# Patient Record
Sex: MALE | URBAN - METROPOLITAN AREA
[De-identification: names, ages, dates, MRNs, and addresses within clinical notes are randomized per-mention and may not be internally consistent; named-entity substitution may affect disease eponyms.]

---

## 2023-11-14 ENCOUNTER — HOSPITAL ENCOUNTER (EMERGENCY)
Facility: CLINIC | Age: 73
Discharge: LEFT BEFORE DISCHARGE | End: 2023-11-14
Admitting: EMERGENCY MEDICINE

## 2023-11-14 VITALS
SYSTOLIC BLOOD PRESSURE: 130 MMHG | TEMPERATURE: 100.2 F | DIASTOLIC BLOOD PRESSURE: 61 MMHG | RESPIRATION RATE: 18 BRPM | OXYGEN SATURATION: 98 % | WEIGHT: 120 LBS | HEART RATE: 94 BPM

## 2023-11-14 LAB
ALBUMIN UR-MCNC: 50 MG/DL
APPEARANCE UR: ABNORMAL
BACTERIA #/AREA URNS HPF: ABNORMAL /HPF
BILIRUB UR QL STRIP: NEGATIVE
COLOR UR AUTO: YELLOW
GLUCOSE UR STRIP-MCNC: NEGATIVE MG/DL
HGB UR QL STRIP: ABNORMAL
HYALINE CASTS: 3 /LPF
KETONES UR STRIP-MCNC: NEGATIVE MG/DL
LEUKOCYTE ESTERASE UR QL STRIP: ABNORMAL
MUCOUS THREADS #/AREA URNS LPF: PRESENT /LPF
NITRATE UR QL: NEGATIVE
PH UR STRIP: 7.5 [PH] (ref 5–7)
RBC URINE: 37 /HPF
SP GR UR STRIP: 1.02 (ref 1–1.03)
SQUAMOUS EPITHELIAL: <1 /HPF
UROBILINOGEN UR STRIP-MCNC: NORMAL MG/DL
WBC CLUMPS #/AREA URNS HPF: PRESENT /HPF
WBC URINE: >182 /HPF

## 2023-11-14 PROCEDURE — 81001 URINALYSIS AUTO W/SCOPE: CPT | Performed by: EMERGENCY MEDICINE

## 2023-11-14 PROCEDURE — 87086 URINE CULTURE/COLONY COUNT: CPT | Performed by: EMERGENCY MEDICINE

## 2023-11-14 PROCEDURE — 99281 EMR DPT VST MAYX REQ PHY/QHP: CPT

## 2023-11-14 NOTE — ED TRIAGE NOTES
Pt reports that he has been having urinary frequency, dysuria for that last few days. Pt reports going to UC today and they checked a UA. Pt denies belly pain but states that he has been more fatigued. Pt VSS and ABC's intact

## 2023-11-15 ENCOUNTER — TELEPHONE (OUTPATIENT)
Dept: EMERGENCY MEDICINE | Facility: CLINIC | Age: 73
End: 2023-11-15

## 2023-11-15 LAB — BACTERIA UR CULT: ABNORMAL

## 2023-11-15 NOTE — LETTER
November 15, 2023        Soren Calero  No address on file.          Dear Soren Calero:    You were seen in the [facility] Emergency Department at [facility] EMERGENCY DEPT on 11/14/2023.  We are unable to reach you by phone, so we are sending you this letter.     It is important that you call [facility] Emergency Department lab result nurse at [phone], as we have information to relay to you AND/OR we MAY have to make some changes in your treatment.    Best time to call back is between 9AM and 5:30PM, 7 days a week.      Sincerely,     [facility] Emergency Department Lab Result RN  [phone]

## 2023-11-15 NOTE — TELEPHONE ENCOUNTER
"[facility] Emergency Department/Urgent Care Lab result notification  [Note:  ED Lab Results RN will reference the [facility] Emergency Dept visit note prior to contacting patient AND/OR prior to consulting Emergency Dept Provider.  Highlights of Emergency Dept visit in information summary at the bottom of this telephone note]    1. Reason for call  Notify of lab results  Assess patient symptoms [if necessary]  Review ED Providers recommendations/discharge instructions (if necessary)  Advise per [facility] ED lab result protocol    2. Lab Result (including Rx patient on, if applicable).  If culture, copy of lab report at bottom.  Preliminary urine culture report on 11/15/23 shows the presence of bacteria(s):  >100,000 CFU/ML Escherichia coli  Emergency Dept/Urgent Care discharge antibiotic: None  Recommendations per [facility] ED Lab result Urine culture protocol.    3. RN Assessment (Patient's current Symptoms):  Time of call: 1210  Assessment: Called, Unable to leave message  Letter sent    4. RN Recommendations/Instructions per [place] ED lab result protocol  [facility] ED lab result protocol used: Urine Cx  Unable to leave voicemail  Letter sent     Information summary from Emergency Dept/Urgent Care visit on 11/14/23  Symptoms reported at ED/UC visit (Chief complaint, HPI) From Triage Note (LWBS)    Pt reports that he has been having urinary frequency, dysuria for that last few days. Pt reports going to UC today and they checked a UA. Pt denies belly pain but states that he has been more fatigued. Pt VSS and ABC's intact    Significant Medical hx, if applicable (i.e. CKD, diabetes) Unk   Allergies No Known Allergies   Weight, if applicable Wt Readings from Last 2 Encounters:   11/14/23 54.4 kg (120 lb)      Coumadin/Warfarin [Yes /No] No   Creatinine Level (mg/dl) No results found for: \"CR\"   Creatinine clearance (ml/min), if applicable Creatinine clearance cannot be calculated " (No successful lab value found.)   ED/UC Provider Impression and Plan (applicable information) LWBS   ED/UC diagnosis LWBS   ED/UC Provider LWBS       Copy of Lab report (if applicable)        Quan Merchant RN  [facility]  Emergency Dept Lab Result RN  # [phone]

## 2023-11-16 NOTE — TELEPHONE ENCOUNTER
"[facility]) Emergency Department/Urgent Care Lab result notification  [Note:  ED Lab Results RN will reference the [facility] Emergency Dept visit note prior to contacting patient AND/OR prior to consulting Emergency Dept Provider.  Highlights of Emergency Dept visit in information summary at the bottom of this telephone note]    1. Reason for call  Notify of lab results  Assess patient symptoms [if necessary]  Review ED Providers recommendations/discharge instructions (if necessary)  Advise per [facility] ED lab result protocol    2. Lab Result (including Rx patient on, if applicable).  If culture, copy of lab report at bottom.  Final urine culture on 11/15/23 shows the presence of bacteria(s): >100,000 CFU/ML Escherichia coli  [facility] Emergency Dept discharge antibiotic: None  Recommendations in treatment per [facility] ED lab result Urine Culture protocol.    3. RN Assessment (Patient's current Symptoms):  Time of call: 11/16/2023 12:54 PM  Assessment: NKA to antibiotics, LWBS after triage, upset because they had to wait 5 hours in ED, patient reporting urinary frequency, dysuria, more fatigued, patient and wife on the phone calling writer \"you little fucker\", they start screaming at me about the care they received, \"Your totally worthless, you don't have patient relations, you're horrible\" - wife is screaming obscenities, swearing and being verbally abuse towards writer on the phone, she initially hangs up before being able to give patient significant lab results, then writer attempts call back notified of lab trying to give patient significant lab results, and wife starts screaming saying \"[facility] [place] notified us of the results\" she continues to scream writer is unable to understand her language - ends call d/t abusive nature - did attempt several times to apologize, offer lab results and recommendations/treatment, and give information for patient relations - " "writer will note refusal to communicate and patient/wife responsibility to manage labs after leaving emergency without speaking with provider.  Writer will remove himself from verbally abuse situation by ending call since patient refuses lab results notification - call ended.    4. RN Recommendations/Instructions per [place] ED lab result protocol  [facility] ED lab result protocol used: Urine culture  Patient was notified of lab result and treatment recommendations but refused      5. Please Contact your PCP clinic or return to the Emergency department if your:  Symptoms return.  Symptoms worsen or other concerning symptoms.    Information summary from Emergency Dept/Urgent Care visit on 11/14/23  Allergies No Known Allergies   Weight, if applicable Wt Readings from Last 2 Encounters:   11/14/23 54.4 kg (120 lb)      Coumadin/Warfarin [Yes /No] unknown   Creatinine Level (mg/dl) No results found for: \"CR\"   Creatinine clearance (ml/min), if applicable Creatinine clearance cannot be calculated (No successful lab value found.)       Copy of Lab report (if applicable)  Urine Culture  Order: [id number] - Reflex for Order [id number]  Status: Final result       Visible to patient: No (inaccessible in MyChart)    Specimen Information: Urine, Midstream   3 Result Notes  Culture >100,000 CFU/mL Escherichia coli Abnormal            Resulting Agency: [facility]     Susceptibility    Escherichia coli (1)    Antibiotic Interpretation Sensitivity  Method Status    Ampicillin Susceptible 4 ug/mL GIOVANNA Final    Ampicillin/ Sulbactam Susceptible <=2 ug/mL GIOVANNA Final    Piperacillin/Tazobactam Susceptible <=4 ug/mL GIOVANNA Final    Cefazolin Susceptible <=4 ug/mL GIOVANNA Final     Cefazolin GIOVANNA breakpoints are for the treatment of uncomplicated urinary tract infections. For the treatment of systemic infections, please contact the laboratory for additional testing.       Cefoxitin Susceptible <=4 ug/mL GIOVANNA Final    Ceftazidime Susceptible <=1 " ug/mL GIOVANNA Final    Ceftriaxone Susceptible <=1 ug/mL GIOVANNA Final    Cefepime Susceptible <=1 ug/mL GIOVANNA Final    Gentamicin Susceptible <=1 ug/mL GIOVANNA Final    Tobramycin Susceptible <=1 ug/mL GIOVANNA Final    Ciprofloxacin Susceptible <=0.25 ug/mL GIOVANNA Final    Levofloxacin Susceptible <=0.12 ug/mL GIOVANNA Final    Nitrofurantoin Susceptible <=16 ug/mL GIOVANNA Final    Trimethoprim/Sulfamethoxazole Susceptible <=1/19 ug/mL GIOVANNA Final          Specimen Collected: 11/14/23 11:50 AM Last Resulted: 11/15/23 11:52 PM               Wellington Montoya RN  [facility]  Emergency Dept Lab Result RN  Ph# [phone]